# Patient Record
Sex: MALE | Race: WHITE
[De-identification: names, ages, dates, MRNs, and addresses within clinical notes are randomized per-mention and may not be internally consistent; named-entity substitution may affect disease eponyms.]

---

## 2019-05-09 ENCOUNTER — HOSPITAL ENCOUNTER (EMERGENCY)
Dept: HOSPITAL 52 - LL.ED | Age: 54
Discharge: HOME | End: 2019-05-09
Payer: MEDICAID

## 2019-05-09 VITALS — SYSTOLIC BLOOD PRESSURE: 128 MMHG | DIASTOLIC BLOOD PRESSURE: 69 MMHG

## 2019-05-09 DIAGNOSIS — Z79.82: ICD-10-CM

## 2019-05-09 DIAGNOSIS — N39.0: Primary | ICD-10-CM

## 2019-05-09 DIAGNOSIS — Z79.899: ICD-10-CM

## 2019-05-09 LAB
CHLORIDE SERPL-SCNC: 101 MMOL/L (ref 98–107)
SODIUM SERPL-SCNC: 137 MMOL/L (ref 136–145)

## 2020-07-19 ENCOUNTER — HOSPITAL ENCOUNTER (EMERGENCY)
Dept: HOSPITAL 50 - VM.ED | Age: 55
Discharge: ANOTHER HOSPITAL | End: 2020-07-19
Payer: MEDICAID

## 2020-07-19 DIAGNOSIS — G45.9: Primary | ICD-10-CM

## 2020-07-19 DIAGNOSIS — Z79.899: ICD-10-CM

## 2020-07-19 DIAGNOSIS — Z79.82: ICD-10-CM

## 2020-07-19 LAB
ANION GAP SERPL CALC-SCNC: 16.7 MMOL/L (ref 10–20)
APTT PPP: 27 SEC (ref 25.6–32.8)
CHLORIDE SERPL-SCNC: 99 MMOL/L (ref 98–107)
SODIUM SERPL-SCNC: 135 MMOL/L (ref 136–145)

## 2020-07-19 NOTE — CT
______________________________________________________________________________   

  

[id number] CT/CT Head Stroke Protocol  

EXAM: CT Head Stroke Protocol  

   

 CLINICAL DATA: STROKE CODE.  

   

 COMPARISON STUDY: None  

   

 FINDINGS:  

   

 No intracranial hemorrhage, extra-axial fluid collection, mass, or acute  

 ischemia.   

   

 Soft tissues are unremarkable. Mild mucosal thickening of the paranasal sinuses.  

 No air-fluid levels. No evidence of sinusitis.  

    

 IMPRESSION:  

   

 No acute intracranial findings.  

   

 Electronically signed by Manuel Morrison MD on 7/19/2020 4:53 PM  

   

  

Manuel Mrorison DO                 

 07/19/20 [id number]    

  

Thank you for allowing us to participate in the care of your patient.

## 2020-07-19 NOTE — CT
______________________________________________________________________________   

  

[id number] CT/CTA Head   Neck  

EXAM: CTA HEAD AND NECK.  

   

 INDICATION: STROKE CODE.  

   

 COMPARISON: None.  

   

 DISCUSSION: There is a normal three-vessel configuration of a left aortic arch.  

 The bilateral common, internal and external carotid arteries are widely patent  

 without hemodynamically significant stenosis or occlusion. No significant  

 atherosclerotic plaques are identified.  

   

 The right vertebral artery is dominant. The left vertebral artery is diminutive  

 and ends at the PICA. Evaluation of the intracranial arteries is somewhat  

 limited secondary to suboptimal bolus. However, the anterior cerebral arteries  

 are symmetric and widely patent.  

   

 The visualized portions of the middle cerebral arteries are symmetric without  

 hemodynamically significant stenosis or occlusion. The posterior communicating  

 arteries are not well visualized. The posterior cerebral arteries are diminutive  

 however there is no visualized asymmetry, stenosis or occlusion.  

   

 There is mild mucosal thickening seen throughout the paranasal sinuses. No  

 air-fluid levels.  

   

 IMPRESSION:  

 1.  No evidence of significant stenosis or large vessel occlusion within the  

 head or neck.  

   

 Electronically signed by Manuel Morrison MD on 7/19/2020 5:54 PM  

   

  

Manuel Morrison DO                 

 07/19/20 [id number]    

  

Thank you for allowing us to participate in the care of your patient.

## 2020-07-19 NOTE — EDM.PDOC
ED HPI GENERAL MEDICAL PROBLEM





- General


Stated Complaint: stroke code


Time Seen by Provider: 07/19/20 16:12


Source of Information: Reports: Patient


History Limitations: Reports: No Limitations





- History of Present Illness


INITIAL COMMENTS - FREE TEXT/NARRATIVE: 





Patient comes emergency department today from home with acute onsets of right 

arm and leg paresthesias and speech changes.  Acutely at 1545 the patient was at

home when suddenly felt like his right arm was discoordinated.  He was trying to

hold a Stylus to work on a Ipad and it was very difficult and uncoordinated.  He

feels like his tongue is thick and difficult to speak.  Complains of pain behind

his right eye.  No headache.  No diplopia. Does have some blurry speech. He also

notes that his right leg is somewhat discoordinated as well.  No Chest pain no 

shortness of breath or difficulty breathing.  No dizziness does have some 

sensation of vertigo movement when his eyes are closed and he is not moving.  No

visual disturbances.  No palpitations.  No syncope.  No abdominal pain nausea or

vomiting.  No fever no chills. No COVID exposure no COVID symptoms. Abd pain 

nausea or vomiting. No recent falls or trauma. 


Onset: Today, Sudden


Onset Date: 07/19/20


Onset Time: 15:45





- Related Data


                                    Allergies











Allergy/AdvReac Type Severity Reaction Status Date / Time


 


No Known Drug Allergies Allergy  Other Verified 09/14/15 21:36











Home Meds: 


                                    Home Meds





Acetaminophen [Tylenol] 650 mg PO Q4H PRN 05/09/19 [History]


Aspirin [Lo-Dose Aspirin EC] 81 mg PO DAILY 05/09/19 [History]


Ciprofloxacin/Ciprofloxa HCl [Cipro Xr 500 mg Tablet] 500 mg PO BID 7 Days #14 

tbmp.24hr 05/09/19 [Rx]


Dextran 70/Hypromellose [Artificial Tears] 1 drop EYEBOTH ASDIRECTED PRN 

05/09/19 [History]


LORazepam [Ativan] 0.5 mg PO DAILY PRN 05/09/19 [History]


PARoxetine HCL [Paroxetine HCl] 30 mg PO BID 05/09/19 [History]


Phenazopyridine HCl [Pyridium] 100 mg PO Q8HR 3 Days #10 tablet 05/09/19 [Rx]


Promethazine [Phenergan] 25 mg PO Q6H PRN 5 Days #20 tab 05/09/19 [Rx]


metFORMIN HCl [Metformin HCl ER] 1,000 mg PO DAILY@1700 05/09/19 [History]











Past Medical History


Genitourinary History: Reports: Renal Calculus





ED ROS GENERAL





- Review of Systems


Review Of Systems: Comprehensive ROS is negative, except as noted in HPI.





ED EXAM, NEURO





- Physical Exam


Exam: See Below


Exam Limited By: No Limitations


General Appearance: Alert, WD/WN, No Apparent Distress


Eye Exam: Bilateral Eye: EOMI, PERRL


Ears: Normal External Exam


Nose: Normal Inspection


Throat/Mouth: Normal Inspection


Head Exam: Atraumatic, Normocephalic


Neck: Normal Inspection, Supple, Non-Tender, Full Range of Motion


Respiratory/Chest: No Respiratory Distress, Lungs Clear, Normal Breath Sounds, 

No Accessory Muscle Use


Cardiovascular: Normal Peripheral Pulses, Regular Rate, Rhythm


GI/Abdominal: Normal Bowel Sounds, Soft, Non-Tender


 (Male) Exam: Deferred


Rectal (Males) Exam: Deferred


Neurological: Alert, Normal Mood/Affect, Normal Dorsiflexion, Normal Plantar 

Flexion, Oriented x 3 (The patient has some thick speech.  That is somewhat 

slurred.  He does have a small amount of ataxia and weakness of his right arm is

 a pronator drift it does not hit the bed but he is able to keep it at the same 

level very similar to the right lower extremity as well.  His NIH is 4.).  No: 

CN II-XII Intact, No Motor/Sensory Deficits


Back Exam: Normal Inspection


Extremities: Normal Inspection, Normal Range of Motion


Psychiatric: Normal Affect, Normal Mood


Skin Exam: Warm, Dry, Intact, Normal Color





EKG INTERPRETATION


EKG Date: 07/19/20


Time: 16:18


Rhythm: NSR


Rate (Beats/Min): 88


Axis: Normal


P-Wave: Present


QRS: RBBB


ST-T: Normal


QT: Normal


Comparison: Change From Previous EKG (development of a RBBB)





Course





- Orders/Labs/Meds


Orders: 


                               Active Orders 24 hr











 Category Date Time Status


 


 EKG Documentation Completion [RC] STAT Care  07/19/20 16:17 Active


 


 CTA Neck W & W/O Contrast [Ang Neck] [CT] Stat Exams  07/19/20 16:18 Taken


 


 Peripheral IV Insertion Adult [OM.PC] Stat Oth  07/19/20 16:17 Ordered











Labs: 


                                Laboratory Tests











  07/19/20 07/19/20 07/19/20 Range/Units





  16:14 16:18 16:18 


 


WBC   6.1   (4.0-10.0)  x10^3/uL


 


RBC   5.72   (4.5-6.0)  x10^6/uL


 


Hgb   16.8   (14.0-18.0)  g/dL


 


Hct   46.3   (40.0-52.0)  %


 


MCV   80.9   (78.0-93.0)  fL


 


MCH   29.4   (26.0-32.0)  pg


 


MCHC   36.3 H   (32.0-36.0)  g/dL


 


RDW Coeff of Lidia   12.9   (10.0-15.0)  %


 


Plt Count   248   (130-400)  x10^3/uL


 


Neut % (Auto)   59.4   (50.0-80.0)  %


 


Lymph % (Auto)   32.9   (25.0-50.0)  %


 


Mono % (Auto)   6.3   (2.0-11.0)  %


 


Eos % (Auto)   1.2   (0.0-4.0)  %


 


Baso % (Auto)   0.2   (0.2-1.2)  %


 


PT     (9.5-12.3)  SEC


 


INR     (2.0-3.5)  


 


APTT     (25.6-32.8)  SEC


 


Sodium    135 L  (136-145)  mmol/L


 


Potassium    3.7  (3.5-5.1)  mmol/L


 


Chloride    99  ()  mmol/L


 


Carbon Dioxide    23  (21-32)  mmol/L


 


Anion Gap    16.7  (10-20)  mmol/L


 


BUN    11  (7-18)  mg/dL


 


Creatinine    1.2  (0.70-1.30)  mg/dL


 


Est Cr Clr Drug Dosing    TNP  


 


Estimated GFR (MDRD)    > 60  


 


Glucose    258 H  ()  mg/dL


 


POC Glucose  242 H    ()  mg/dL


 


Calcium    8.6  (8.5-10.1)  mg/dL


 


Corrected Calcium    8.60  (8.5-10.1)  mg/dL


 


Magnesium    1.8  (1.8-2.4)  mg/dL


 


Total Bilirubin    1.7 H  (0.2-1.0)  mg/dL


 


AST    16  (15-37)  U/L


 


ALT    31  (16-63)  U/L


 


Alkaline Phosphatase    131 H  ()  U/L


 


POC Troponin I     (0.00-0.08)  ng/mL


 


Total Protein    7.1  (6.4-8.2)  g/dL


 


Albumin    4.0  (3.4-5.0)  g/dL


 


Globulin    3.1  


 


Albumin/Globulin Ratio    1.29  


 


Ethyl Alcohol    < 3  (0-3)  mg/dL














  07/19/20 07/19/20 Range/Units





  16:18 16:22 


 


WBC    (4.0-10.0)  x10^3/uL


 


RBC    (4.5-6.0)  x10^6/uL


 


Hgb    (14.0-18.0)  g/dL


 


Hct    (40.0-52.0)  %


 


MCV    (78.0-93.0)  fL


 


MCH    (26.0-32.0)  pg


 


MCHC    (32.0-36.0)  g/dL


 


RDW Coeff of Lidia    (10.0-15.0)  %


 


Plt Count    (130-400)  x10^3/uL


 


Neut % (Auto)    (50.0-80.0)  %


 


Lymph % (Auto)    (25.0-50.0)  %


 


Mono % (Auto)    (2.0-11.0)  %


 


Eos % (Auto)    (0.0-4.0)  %


 


Baso % (Auto)    (0.2-1.2)  %


 


PT  10.3   (9.5-12.3)  SEC


 


INR  0.9 L   (2.0-3.5)  


 


APTT  27.0   (25.6-32.8)  SEC


 


Sodium    (136-145)  mmol/L


 


Potassium    (3.5-5.1)  mmol/L


 


Chloride    ()  mmol/L


 


Carbon Dioxide    (21-32)  mmol/L


 


Anion Gap    (10-20)  mmol/L


 


BUN    (7-18)  mg/dL


 


Creatinine    (0.70-1.30)  mg/dL


 


Est Cr Clr Drug Dosing    


 


Estimated GFR (MDRD)    


 


Glucose    ()  mg/dL


 


POC Glucose    ()  mg/dL


 


Calcium    (8.5-10.1)  mg/dL


 


Corrected Calcium    (8.5-10.1)  mg/dL


 


Magnesium    (1.8-2.4)  mg/dL


 


Total Bilirubin    (0.2-1.0)  mg/dL


 


AST    (15-37)  U/L


 


ALT    (16-63)  U/L


 


Alkaline Phosphatase    ()  U/L


 


POC Troponin I   0.00  (0.00-0.08)  ng/mL


 


Total Protein    (6.4-8.2)  g/dL


 


Albumin    (3.4-5.0)  g/dL


 


Globulin    


 


Albumin/Globulin Ratio    


 


Ethyl Alcohol    (0-3)  mg/dL











Meds: 


Medications














Discontinued Medications














Generic Name Dose Route Start Last Admin





  Trade Name Salvatore  PRN Reason Stop Dose Admin


 


Aspirin  324 mg  07/19/20 18:35 





  Aspirin  PO  07/19/20 18:36 





  ONETIME ONE  


 


Iopamidol  100 ml  07/19/20 17:00  07/19/20 17:00





  Isovue-300 (61%)  IVPUSH  07/19/20 17:01  100 ml





  ONETIME ONE   Administration


 


Sodium Chloride  10 ml  07/19/20 16:17 





  Saline Flush  FLUSH  





  ASDIRECTED PRN  





  Keep Vein Open  














- Radiology Interpretation


Free Text/Narrative:: 





CT of the head per radiology no acute intracranial findings.





CT of the head and neck per radiology no evidence of significant stenosis or 

large vessel occlusion within the head or neck.





- Re-Assessments/Exams


Free Text/Narrative Re-Assessment/Exam: 





07/19/20 


At the time of the patient's presentation a stroke code was activated.





Was immediately brought to the CT scanner where a CT without contrast was 

completed as well as a CTA head and neck





EKG shows a sinus rhythm with a right bundle branch block which is new when 

compared to his old EKG.





NIH stroke scale is 4





Laboratory evaluation is rather unremarkable other than a glucose of about 250.





After being in the emergency department for about 45 minutes or an hour his 

symptoms did start to improve and really the only deficit that was noted at that

 time was that he had some slurring of his speech the rest of his motor and 

sensory symptoms have resolved.





I called and spoke with Dr. Beavers the neurologist on call at [facility]. I 

reviewed the CT results with Dr Beavers and his guidance was to start an 

aspirin and maybe put on plavix that would decision would be up to myself and 

either discharge home or transfer that would also be up to myself.





As I feel that this is an acute neurological change I then called and spoke with

 Dr. Navas the hospitalist on call at [facility] for my direct guidance. HPI ER 

COURSE findings and concerns were relayed to him. He accepted the patient in 

transfer at this time and his questions were answered and would like us to start

 the patient on an aspirin. 





I discussed the plan of care with the patient he was comfortable with this plan 

and his questions answered. 








Departure





- Departure


Time of Disposition: 16:00


Disposition: DC/Tfer to Acute Hospital 02


Clinical Impression: 


 TIA (transient ischemic attack)








- Discharge Information


Referrals: 


Tyesha Grigsby NP [Ordering Only Provider] - 


Forms:  Interfacility Transfer EMTALA





- My Orders


Last 24 Hours: 


My Active Orders





07/19/20 16:17


EKG Documentation Completion [RC] STAT 


Peripheral IV Insertion Adult [OM.PC] Stat 





07/19/20 16:18


CTA Neck W & W/O Contrast [Ang Neck] [CT] Stat 














- Assessment/Plan


Last 24 Hours: 


My Active Orders





07/19/20 16:17


EKG Documentation Completion [RC] STAT 


Peripheral IV Insertion Adult [OM.PC] Stat 





07/19/20 16:18


CTA Neck W & W/O Contrast [Ang Neck] [CT] Stat

## 2023-05-16 ENCOUNTER — HOSPITAL ENCOUNTER (EMERGENCY)
Dept: HOSPITAL 50 - VM.ED | Age: 58
Discharge: HOME | End: 2023-05-16
Payer: OTHER GOVERNMENT

## 2023-05-16 VITALS — DIASTOLIC BLOOD PRESSURE: 70 MMHG | HEART RATE: 96 BPM | SYSTOLIC BLOOD PRESSURE: 119 MMHG

## 2023-05-16 DIAGNOSIS — J20.9: Primary | ICD-10-CM

## 2023-05-16 DIAGNOSIS — E11.9: ICD-10-CM

## 2023-05-16 DIAGNOSIS — I10: ICD-10-CM

## 2024-08-21 ENCOUNTER — HOSPITAL ENCOUNTER (EMERGENCY)
Dept: HOSPITAL 50 - VM.ED | Age: 59
Discharge: HOME | End: 2024-08-21
Payer: OTHER GOVERNMENT

## 2024-08-21 VITALS — DIASTOLIC BLOOD PRESSURE: 75 MMHG | HEART RATE: 66 BPM | SYSTOLIC BLOOD PRESSURE: 146 MMHG

## 2024-08-21 DIAGNOSIS — E78.00: ICD-10-CM

## 2024-08-21 DIAGNOSIS — Z91.018: ICD-10-CM

## 2024-08-21 DIAGNOSIS — Z79.899: ICD-10-CM

## 2024-08-21 DIAGNOSIS — Z88.8: ICD-10-CM

## 2024-08-21 DIAGNOSIS — E86.0: ICD-10-CM

## 2024-08-21 DIAGNOSIS — E11.649: Primary | ICD-10-CM

## 2024-08-21 DIAGNOSIS — Z79.84: ICD-10-CM

## 2024-08-21 DIAGNOSIS — Z79.4: ICD-10-CM

## 2024-08-21 LAB
ALBUMIN SERPL-MCNC: 4.6 G/DL (ref 3.4–5)
ALBUMIN/GLOB SERPL: 1.44 {RATIO}
ALP SERPL-CCNC: 74 U/L (ref 46–116)
ALT SERPL-CCNC: 28 U/L (ref 16–63)
AMPHET UR QL SCN: NEGATIVE
ANION GAP SERPL CALC-SCNC: 15.1 MMOL/L (ref 5–15)
APPEARANCE UR: CLEAR
APTT PPP: 30.2 SEC (ref 21.9–33.8)
AST SERPL-CCNC: 16 U/L (ref 15–37)
BARBITURATES UR QL SCN: NEGATIVE
BASOPHILS # BLD AUTO: 0 X10^3/UL (ref 0–0.2)
BASOPHILS NFR BLD AUTO: 0.2 % (ref 0.2–1.2)
BENZODIAZ UR QL SCN: NEGATIVE
BILIRUB SERPL-MCNC: 2.4 MG/DL (ref 0.2–1)
BILIRUB UR STRIP-MCNC: NEGATIVE MG/DL
BUN SERPL-MCNC: 15 MG/DL (ref 7–18)
BUPRENORPHINE UR QL: NEGATIVE
CALCIUM SERPL-MCNC: 9.4 MG/DL (ref 8.5–10.1)
CHLORIDE SERPL-SCNC: 105 MMOL/L (ref 98–107)
CO2 SERPL-SCNC: 27 MMOL/L (ref 21–32)
COCAINE UR QL SCN: NEGATIVE
COLOR UR: YELLOW
CREAT CL 24H UR+SERPL-VRATE: (no result) ML/MIN
CREAT SERPL-MCNC: 1.1 MG/DL (ref 0.7–1.3)
CRP SERPL-MCNC: < 0.5 MG/DL (ref ?–0.5)
EGFRCR SERPLBLD CKD-EPI 2021: 76 ML/MIN (ref 60–?)
EOSINOPHIL # BLD AUTO: 0.1 X10^3/UL (ref 0–0.5)
EOSINOPHIL NFR BLD AUTO: 1.5 % (ref 0–4)
ETHANOL BLD-MCNC: < 3 MG/DL (ref 0–3)
GLOBULIN SER-MCNC: 3.2 G/DL
GLUCOSE SERPL-MCNC: 133 MG/DL (ref 70–99)
GLUCOSE UR STRIP-MCNC: 500 MG/DL
HCT VFR BLD AUTO: 50.2 % (ref 40–52)
HGB BLD-MCNC: 17.9 G/DL (ref 14–18)
IMM GRANULOCYTES # BLD: 0.01 X10^3/UL (ref 0–0.07)
IMM GRANULOCYTES NFR BLD: 0.2 % (ref 0–0.43)
INR PPP: 1.1 (ref 0.9–1.1)
KETONES UR STRIP-MCNC: 15 MG/DL
LACTATE SERPL-SCNC: 1.6 MMOL/L (ref 0.4–2)
LYMPHOCYTES # BLD AUTO: 2.4 X10^3/UL (ref 1–4.8)
LYMPHOCYTES NFR BLD AUTO: 38.1 % (ref 25–50)
MAGNESIUM SERPL-MCNC: 2.3 MG/DL (ref 1.8–2.4)
MCH RBC QN AUTO: 28.2 PG (ref 26–32)
MCHC RBC AUTO-ENTMCNC: 35.7 G/DL (ref 32–36)
MCHC RBC AUTO-ENTMCNC: 79.1 FL (ref 78–93)
METHADONE UR QL SCN: NEGATIVE
METHADONE UR QL SCN: NEGATIVE
MONOCYTES # BLD AUTO: 0.4 X10^3/UL (ref 0–0.8)
MONOCYTES NFR BLD AUTO: 6.5 % (ref 2–11)
NEUTROPHILS # BLD AUTO: 3.3 X10^3/UL (ref 1.8–7.7)
NEUTROPHILS NFR BLD AUTO: 53.5 % (ref 50–80)
NITRITE UR QL: NEGATIVE
OXYCODONE UR QL SCN: NEGATIVE
PCP UR QL SCN: NEGATIVE
PH UR STRIP: 7.5 [PH] (ref 5–8)
PLATELET # BLD AUTO: 231 X10^3/UL (ref 130–400)
POTASSIUM SERPL-SCNC: 4.1 MMOL/L (ref 3.5–5.1)
PROT SERPL-MCNC: 7.8 G/DL (ref 6.4–8.2)
PROT UR STRIP-MCNC: NEGATIVE MG/DL
PROTHROMBIN TIME: 10.9 SEC (ref 8.9–11.5)
RBC # BLD AUTO: 6.35 X10^6/UL (ref 4.5–6)
RBC UR QL: NEGATIVE
SODIUM SERPL-SCNC: 143 MMOL/L (ref 136–145)
SP GR UR STRIP: 1.02 (ref 1–1.03)
THC UR QL SCN>50 NG/ML: NEGATIVE
TSH SERPL DL<=0.005 MIU/L-ACNC: 1.82 UIU/ML (ref 0.36–3.74)
UROBILINOGEN UR STRIP-ACNC: 0.2 EU/DL
WBC # BLD AUTO: 6.2 X10^3/UL (ref 4–10)

## 2025-01-29 ENCOUNTER — HOSPITAL ENCOUNTER (EMERGENCY)
Dept: HOSPITAL 50 - VM.ED | Age: 60
Discharge: HOME | End: 2025-01-29
Payer: OTHER GOVERNMENT

## 2025-01-29 VITALS — SYSTOLIC BLOOD PRESSURE: 166 MMHG | DIASTOLIC BLOOD PRESSURE: 76 MMHG

## 2025-01-29 VITALS — HEART RATE: 96 BPM

## 2025-01-29 DIAGNOSIS — E78.00: ICD-10-CM
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
## 2025-01-29 DIAGNOSIS — Z88.8: ICD-10-CM

## 2025-01-29 DIAGNOSIS — Z79.899: ICD-10-CM

## 2025-01-29 DIAGNOSIS — M54.50: ICD-10-CM

## 2025-01-29 DIAGNOSIS — Z79.84: ICD-10-CM

## 2025-01-29 DIAGNOSIS — Z79.4: ICD-10-CM

## 2025-01-29 DIAGNOSIS — E11.9: ICD-10-CM

## 2025-01-29 DIAGNOSIS — S09.90XA: Primary | ICD-10-CM

## 2025-01-29 DIAGNOSIS — Z91.048: ICD-10-CM

## 2025-01-29 LAB
ALBUMIN SERPL-MCNC: 4.4 G/DL (ref 3.4–5)
ALBUMIN/GLOB SERPL: 1.38 {RATIO}
ALP SERPL-CCNC: 95 U/L (ref 46–116)
ALT SERPL-CCNC: 29 U/L (ref 16–63)
ANION GAP SERPL CALC-SCNC: 17.8 MMOL/L (ref 5–15)
AST SERPL-CCNC: 16 U/L (ref 15–37)
BILIRUB SERPL-MCNC: 2.2 MG/DL (ref 0.2–1)
BUN SERPL-MCNC: 14 MG/DL (ref 7–18)
CALCIUM SERPL-MCNC: 9.9 MG/DL (ref 8.5–10.1)
CHLORIDE SERPL-SCNC: 102 MMOL/L (ref 98–107)
CO2 SERPL-SCNC: 23 MMOL/L (ref 21–32)
CREAT CL 24H UR+SERPL-VRATE: (no result) ML/MIN
CREAT SERPL-MCNC: 1.2 MG/DL (ref 0.7–1.3)
EGFRCR SERPLBLD CKD-EPI 2021: 69 ML/MIN (ref 60–?)
GLOBULIN SER-MCNC: 3.2 G/DL
GLUCOSE SERPL-MCNC: 210 MG/DL (ref 70–99)
HCT VFR BLD AUTO: 48.5 % (ref 40–52)
HGB BLD-MCNC: 17.7 G/DL (ref 14–18)
MCH RBC QN AUTO: 29.6 PG (ref 26–32)
MCHC RBC AUTO-ENTMCNC: 36.5 G/DL (ref 32–36)
MCHC RBC AUTO-ENTMCNC: 81.2 FL (ref 78–93)
PLATELET # BLD AUTO: 278 X10^3/UL (ref 130–400)
POTASSIUM SERPL-SCNC: 3.8 MMOL/L (ref 3.5–5.1)
PROT SERPL-MCNC: 7.6 G/DL (ref 6.4–8.2)
RBC # BLD AUTO: 5.97 X10^6/UL (ref 4.5–6)
SODIUM SERPL-SCNC: 139 MMOL/L (ref 136–145)
WBC # BLD AUTO: 7.2 X10^3/UL (ref 4–10)